# Patient Record
Sex: FEMALE | Race: WHITE | URBAN - METROPOLITAN AREA
[De-identification: names, ages, dates, MRNs, and addresses within clinical notes are randomized per-mention and may not be internally consistent; named-entity substitution may affect disease eponyms.]

---

## 2017-01-04 ENCOUNTER — TELEPHONE (OUTPATIENT)
Dept: ORTHOPEDICS | Facility: CLINIC | Age: 42
End: 2017-01-04

## 2017-01-04 NOTE — TELEPHONE ENCOUNTER
Called and spoke to patient. She is requesting orders for a bone density scan and send this to [facility]. She says she would be out of network with [facility] so she is unsure if anyone at  could even write this order for her. Will route to Dr. Car for review.

## 2017-01-04 NOTE — TELEPHONE ENCOUNTER
[facility] Call Center    Phone Message    Name of Caller: TIFFANIE NAVARRO    Phone Number: Home number on file [phone] (home)    Best time to return call: TODAY.  AT [facility] in [place].  is out of network. Phone is [phone].  Primary physician has not seen patient for bone problems so that is why she asking Dr Car for order.     May a detailed message be left on voicemail: yes    Relation to patient: Self    Reason for Call: Other: See above message.

## 2017-01-05 ENCOUNTER — TELEPHONE (OUTPATIENT)
Dept: ORTHOPEDICS | Facility: CLINIC | Age: 42
End: 2017-01-05

## 2017-01-05 NOTE — TELEPHONE ENCOUNTER
Per Dr. Car if [facility] is out of network she will end up paying out of pocket for the bone scan. He is suggesting he go to her primary care physician for the order.

## 2017-01-05 NOTE — TELEPHONE ENCOUNTER
Spoke to patient, suggested she go though PCP for bone scan order if [facility] is out of network. All her questions were answered.

## 2017-01-13 ENCOUNTER — THERAPY VISIT (OUTPATIENT)
Dept: PHYSICAL THERAPY | Facility: CLINIC | Age: 42
End: 2017-01-13
Payer: COMMERCIAL

## 2017-01-13 DIAGNOSIS — M25.552 HIP PAIN, LEFT: ICD-10-CM

## 2017-01-13 DIAGNOSIS — M21.612 BUNION OF LEFT FOOT: ICD-10-CM

## 2017-01-13 DIAGNOSIS — M25.551 HIP PAIN, RIGHT: Primary | ICD-10-CM

## 2017-01-13 DIAGNOSIS — M79.672 LEFT FOOT PAIN: ICD-10-CM

## 2017-01-13 DIAGNOSIS — M54.50 BILATERAL LOW BACK PAIN WITHOUT SCIATICA, UNSPECIFIED CHRONICITY: ICD-10-CM

## 2017-01-13 PROCEDURE — 97112 NEUROMUSCULAR REEDUCATION: CPT | Mod: GP | Performed by: PHYSICAL THERAPIST

## 2017-01-13 PROCEDURE — 97110 THERAPEUTIC EXERCISES: CPT | Mod: GP | Performed by: PHYSICAL THERAPIST

## 2017-01-13 ASSESSMENT — ACTIVITIES OF DAILY LIVING (ADL)
WALKING_APPROXIMATELY_10_MINUTES: NO DIFFICULTY AT ALL
HEAVY_WORK: SLIGHT DIFFICULTY
LIGHT_TO_MODERATE_WORK: NO DIFFICULTY AT ALL
GOING_UP_1_FLIGHT_OF_STAIRS: NO DIFFICULTY AT ALL
GOING_DOWN_1_FLIGHT_OF_STAIRS: NO DIFFICULTY AT ALL
PUTTING_ON_SOCKS_AND_SHOES: SLIGHT DIFFICULTY
STANDING_FOR_15_MINUTES: NO DIFFICULTY AT ALL
RECREATIONAL_ACTIVITIES: SLIGHT DIFFICULTY
STEPPING_UP_AND_DOWN_CURBS: NO DIFFICULTY AT ALL
HOS_ADL_COUNT: 17
HOS_ADL_ITEM_SCORE_TOTAL: 62
WALKING_INITIALLY: NO DIFFICULTY AT ALL
GETTING_INTO_AND_OUT_OF_AN_AVERAGE_CAR: NO DIFFICULTY AT ALL
WALKING_DOWN_STEEP_HILLS: NO DIFFICULTY AT ALL
HOW_WOULD_YOU_RATE_YOUR_CURRENT_LEVEL_OF_FUNCTION_DURING_YOUR_USUAL_ACTIVITIES_OF_DAILY_LIVING_FROM_0_TO_100_WITH_100_BEING_YOUR_LEVEL_OF_FUNCTION_PRIOR_TO_YOUR_HIP_PROBLEM_AND_0_BEING_THE_INABILITY_TO_PERFORM_ANY_OF_YOUR_USUAL_DAILY_ACTIVITIES?: 70
DEEP_SQUATTING: SLIGHT DIFFICULTY
HOS_ADL_HIGHEST_POTENTIAL_SCORE: 68
ROLLING_OVER_IN_BED: SLIGHT DIFFICULTY
TWISTING/PIVOTING_ON_INVOLVED_LEG: NO DIFFICULTY AT ALL
SITTING_FOR_15_MINUTES: NO DIFFICULTY AT ALL
GETTING_INTO_AND_OUT_OF_A_BATHTUB: SLIGHT DIFFICULTY
WALKING_UP_STEEP_HILLS: NO DIFFICULTY AT ALL
WALKING_15_MINUTES_OR_GREATER: SLIGHT DIFFICULTY
HOS_ADL_SCORE(%): 91.18

## 2017-01-13 NOTE — PROGRESS NOTES
Subjective:    HPI                    Objective:    System    Physical Exam    General     ROS    Assessment/Plan:      PROGRESS  REPORT    Progress reporting period is from 11/11/2016 to 1/13/2017.       SUBJECTIVE  Patient relates that she is better with regards to her L hip and lower back pain.  Increased sitting tolerance in firm and soft surfaces.  Soft surfaces continue to be more bothersome, but as soon as gets up and moves about, symptoms resolve.  Patient relates that she was able to swim for 15 minutes without lower back pain.  Did have central lower back symptoms after out of the pool, nothing significant.  (swimming limited by cardiovascular endurance, not her L hip nor lower back). Notes that she had an achy back this morning and did all her exercises, ROM and strengthening; alleviated the ache.  Always feels better after exercises.  Main issue keeping her from trying to resume running is the pain/ache of the L great toe; notes degenerative arthritis per x-rays.  Current Pain level: 0/10.     Previous pain level was  3/10   Initial Pain level: 8/10 (hip pain/sharp pinch at worst 6/10 and short lived.).   Changes in function:  Yes (See Goal flowsheet attached for changes in current functional level)  Adverse reaction to treatment or activity: None    OBJECTIVE  Changes noted in objective findings:  Yes, full LROM, improved function.  LROM nil loss all directions with ERP for EIS.  (-) SLR   End range pain for the lower back was alleviated with strengthening.   Did not reassess the hip today as symptoms for the hips at re-eval on 11/11/2016, appeared to be stemming from the lower back.    ASSESSMENT/PLAN  Updated problem list and treatment plan: Diagnosis 1:  Lumbar derangement producing B hip pain  Pain -  self management, education, directional preference exercise, home program   Decreased strength - therapeutic exercise, therapeutic activities and home program  Impaired muscle performance - neuro  re-education and home program  Decreased function - therapeutic activities, home program and may need to evaluate L great toe to assist patient in returning to previous level of function.  STG/LTGs have been met or progress has been made towards goals:  Yes (See Goal flow sheet completed today.)  Assessment of Progress: The patient's condition is improving.  The patient's condition has potential to improve.  Patient is meeting short term goals and is progressing towards long term goals.  Self Management Plans:  Patient has been instructed in a home treatment program.  Patient  has been instructed in self management of symptoms.  I have re-evaluated this patient and find that the nature, scope, duration and intensity of the therapy is appropriate for the medical condition of the patient.  Amber continues to require the following intervention to meet STG and LTG's:  PT    Recommendations:  This patient would benefit from continued therapy.     Frequency:  1 X week, once daily  Duration:  for 4 weeks tapering to every other week over 4 weeks  Possibly assess L great toe.        Please refer to the daily flowsheet for treatment today, total treatment time and time spent performing 1:1 timed codes.